# Patient Record
Sex: MALE | Race: BLACK OR AFRICAN AMERICAN | Employment: FULL TIME | URBAN - METROPOLITAN AREA
[De-identification: names, ages, dates, MRNs, and addresses within clinical notes are randomized per-mention and may not be internally consistent; named-entity substitution may affect disease eponyms.]

---

## 2024-11-06 ENCOUNTER — OFFICE VISIT (OUTPATIENT)
Age: 59
End: 2024-11-06
Payer: COMMERCIAL

## 2024-11-06 VITALS — WEIGHT: 170 LBS | BODY MASS INDEX: 21.14 KG/M2 | HEIGHT: 75 IN

## 2024-11-06 DIAGNOSIS — M87.052 AVASCULAR NECROSIS OF BONE OF LEFT HIP: ICD-10-CM

## 2024-11-06 DIAGNOSIS — M25.552 LEFT HIP PAIN: Primary | ICD-10-CM

## 2024-11-06 DIAGNOSIS — M54.16 LUMBAR RADICULOPATHY: ICD-10-CM

## 2024-11-06 PROCEDURE — 99204 OFFICE O/P NEW MOD 45 MIN: CPT | Performed by: PHYSICIAN ASSISTANT

## 2024-11-06 PROCEDURE — 72170 X-RAY EXAM OF PELVIS: CPT | Performed by: PHYSICIAN ASSISTANT

## 2024-11-06 NOTE — PROGRESS NOTES
Patient: Elbert Uribe                MRN: [id number]       SSN: [id number]  YOB: 1965        AGE: 59 y.o.        SEX: male  Body mass index is 21.53 kg/m².    PCP: No primary care provider on file.  11/06/24      This office note has been dictated.      REVIEW OF SYSTEMS:  Constitutional: Negative for fever, chills, weight loss and malaise/fatigue.   HENT: Negative.    Eyes: Negative.    Respiratory: Negative.   Cardiovascular: Negative.   Gastrointestinal: No bowel incontinence or constipation.  Genitourinary: No bladder incontinence or saddle anesthesia.  Skin: Negative.   Neurological: Negative.    Endo/Heme/Allergies: Negative.    Psychiatric/Behavioral: Negative.  Musculoskeletal: As per HPI above.     History reviewed. No pertinent past medical history.    No current outpatient medications on file.    No Known Allergies    Social History     Socioeconomic History    Marital status:      Spouse name: Not on file    Number of children: Not on file    Years of education: Not on file    Highest education level: Not on file   Occupational History    Not on file   Tobacco Use    Smoking status: Unknown    Smokeless tobacco: Not on file   Substance and Sexual Activity    Alcohol use: Not on file    Drug use: Not on file    Sexual activity: Not on file   Other Topics Concern    Not on file   Social History Narrative    Not on file     Social Determinants of Health     Financial Resource Strain: Not on file   Food Insecurity: Not on file   Transportation Needs: Not on file   Physical Activity: Not on file   Stress: Not on file   Social Connections: Not on file   Intimate Partner Violence: Not on file   Housing Stability: Not on file       History reviewed. No pertinent surgical history.        Patient seen evaluated today for repeat advice regarding low back pain, left hip pain and lower extremity pain.  This been going ongoing for 4 to 5 months without injury.  He reports he gets some

## 2024-11-18 ENCOUNTER — OFFICE VISIT (OUTPATIENT)
Age: 59
End: 2024-11-18
Payer: COMMERCIAL

## 2024-11-18 VITALS — BODY MASS INDEX: 21.03 KG/M2 | WEIGHT: 166 LBS

## 2024-11-18 DIAGNOSIS — M99.06 LOWER LIMB REGION SOMATIC DYSFUNCTION: ICD-10-CM

## 2024-11-18 DIAGNOSIS — M99.02 THORACIC REGION SOMATIC DYSFUNCTION: ICD-10-CM

## 2024-11-18 DIAGNOSIS — M16.12 OSTEOARTHRITIS OF ONE HIP, LEFT: ICD-10-CM

## 2024-11-18 DIAGNOSIS — M99.04 SACRAL REGION SOMATIC DYSFUNCTION: ICD-10-CM

## 2024-11-18 DIAGNOSIS — M99.07 UPPER EXTREMITY SOMATIC DYSFUNCTION: ICD-10-CM

## 2024-11-18 DIAGNOSIS — M99.01 CERVICAL SOMATIC DYSFUNCTION: ICD-10-CM

## 2024-11-18 DIAGNOSIS — M99.08 RIB CAGE REGION SOMATIC DYSFUNCTION: ICD-10-CM

## 2024-11-18 DIAGNOSIS — M51.362 DEGENERATION OF INTERVERTEBRAL DISC OF LUMBAR REGION WITH DISCOGENIC BACK PAIN AND LOWER EXTREMITY PAIN: ICD-10-CM

## 2024-11-18 DIAGNOSIS — M25.812 SHOULDER IMPINGEMENT, LEFT: Primary | ICD-10-CM

## 2024-11-18 DIAGNOSIS — M99.09 SOMATIC DYSFUNCTION OF ABDOMINAL REGION: ICD-10-CM

## 2024-11-18 DIAGNOSIS — M99.03 SOMATIC DYSFUNCTION OF LUMBAR REGION: ICD-10-CM

## 2024-11-18 DIAGNOSIS — M99.05 PELVIC REGION SOMATIC DYSFUNCTION: ICD-10-CM

## 2024-11-18 DIAGNOSIS — G57.02 PIRIFORMIS SYNDROME, LEFT: ICD-10-CM

## 2024-11-18 PROCEDURE — 99204 OFFICE O/P NEW MOD 45 MIN: CPT | Performed by: FAMILY MEDICINE

## 2024-11-18 PROCEDURE — 98929 OSTEOPATH MANJ 9-10 REGIONS: CPT | Performed by: FAMILY MEDICINE

## 2024-11-18 RX ORDER — DICLOFENAC SODIUM 75 MG/1
75 TABLET, DELAYED RELEASE ORAL 2 TIMES DAILY WITH MEALS
Qty: 60 TABLET | Refills: 1 | Status: SHIPPED | OUTPATIENT
Start: 2024-11-18

## 2024-11-18 RX ORDER — DICLOFENAC SODIUM 75 MG/1
75 TABLET, DELAYED RELEASE ORAL 2 TIMES DAILY
COMMUNITY
Start: 2024-10-16 | End: 2024-10-23

## 2024-11-18 RX ORDER — CYCLOBENZAPRINE HCL 5 MG
TABLET ORAL
COMMUNITY
Start: 2024-09-22

## 2024-11-18 NOTE — PROGRESS NOTES
HISTORY OF PRESENT ILLNESS    Elbert Uribe 1965 is a 59 y.o. year old male comes in today as new patient for: left shoulder, low back, left hip pain    Patients symptoms have been present for 4 months.  Pain level 10 - Worst pain ever/10. It has improved with Voltaren 75mg.  Patient has tried:  PCP visit and told has arthritis and has done some strengthening.  It is described as pain and numbness left buttock down leg.    Has had MRI hip and lumbar ordered scheduled for 11/27/2024. Here mostly for left shoulder.    IMAGING: XR lumbar 9/20/2024  IMPRESSION:   Degenerative changes left hip.     XR left hip 9/20/2024  IMPRESSION:   Degenerative changes lumbar spine.     XR left shoulder 9/20/2024  IMPRESSION:   Degenerative changes left hip.     No past surgical history on file.  Social History     Socioeconomic History    Marital status:      Spouse name: None    Number of children: None    Years of education: None    Highest education level: None   Tobacco Use    Smoking status: Unknown      Current Outpatient Medications   Medication Sig Dispense Refill    cyclobenzaprine (FLEXERIL) 5 MG tablet take 1 to 2 tablets by mouth at bedtime if needed       No current facility-administered medications for this visit.     No past medical history on file.  No family history on file.    ROS:  + numb, tingle left leg to foot. No incont, fever.    Objective:  Wt 75.3 kg (166 lb)   BMI 21.03 kg/m²   NEURO:  Sensation intact to light touch.  Reflexes +2/4 biceps, triceps, patellar, and Achilles bilaterally.  M/S:  Examined seated and supine.  Slump negative. Spurling negative. Standing flexion test positive left  Sphinx test positive left.  ASIS low left  Iliac crests equal bilaterally Pubes equal bilaterally Medial malleolus low left  Sacral base posterior left  VINNY low left  TTA at C3 on left worse flexion, T2, 4, 5 on right worse flexion, and L4, 5 on left worse flexion  Rib(s) 2, 4, 5 right TTP and posterior

## 2024-11-18 NOTE — PATIENT INSTRUCTIONS
Either scan this QR code on your smartphone:        Or search YouTube for my channel:    Dr. AMEE Camacho    Rotator cuff    Low back/Piriformis

## 2024-11-30 ENCOUNTER — HOSPITAL ENCOUNTER (OUTPATIENT)
Facility: HOSPITAL | Age: 59
Discharge: HOME | End: 2024-12-03
Payer: COMMERCIAL

## 2024-11-30 DIAGNOSIS — M87.052 AVASCULAR NECROSIS OF BONE OF LEFT HIP: ICD-10-CM

## 2024-11-30 DIAGNOSIS — M54.16 LUMBAR RADICULOPATHY: ICD-10-CM

## 2024-11-30 PROCEDURE — 73721 MRI JNT OF LWR EXTRE W/O DYE: CPT

## 2024-11-30 PROCEDURE — 72148 MRI LUMBAR SPINE W/O DYE: CPT

## 2024-12-16 ENCOUNTER — OFFICE VISIT (OUTPATIENT)
Age: 59
End: 2024-12-16
Payer: COMMERCIAL

## 2024-12-16 VITALS — BODY MASS INDEX: 22.8 KG/M2 | WEIGHT: 180 LBS

## 2024-12-16 DIAGNOSIS — M99.07 UPPER EXTREMITY SOMATIC DYSFUNCTION: ICD-10-CM

## 2024-12-16 DIAGNOSIS — M99.09 SOMATIC DYSFUNCTION OF ABDOMINAL REGION: ICD-10-CM

## 2024-12-16 DIAGNOSIS — G57.02 PIRIFORMIS SYNDROME, LEFT: ICD-10-CM

## 2024-12-16 DIAGNOSIS — M25.812 SHOULDER IMPINGEMENT, LEFT: Primary | ICD-10-CM

## 2024-12-16 DIAGNOSIS — M51.362 DEGENERATION OF INTERVERTEBRAL DISC OF LUMBAR REGION WITH DISCOGENIC BACK PAIN AND LOWER EXTREMITY PAIN: ICD-10-CM

## 2024-12-16 DIAGNOSIS — M99.02 THORACIC REGION SOMATIC DYSFUNCTION: ICD-10-CM

## 2024-12-16 DIAGNOSIS — M99.08 RIB CAGE REGION SOMATIC DYSFUNCTION: ICD-10-CM

## 2024-12-16 DIAGNOSIS — M16.12 OSTEOARTHRITIS OF ONE HIP, LEFT: ICD-10-CM

## 2024-12-16 DIAGNOSIS — M99.06 LOWER LIMB REGION SOMATIC DYSFUNCTION: ICD-10-CM

## 2024-12-16 DIAGNOSIS — M99.03 SOMATIC DYSFUNCTION OF LUMBAR REGION: ICD-10-CM

## 2024-12-16 DIAGNOSIS — M99.04 SACRAL REGION SOMATIC DYSFUNCTION: ICD-10-CM

## 2024-12-16 DIAGNOSIS — M99.05 PELVIC REGION SOMATIC DYSFUNCTION: ICD-10-CM

## 2024-12-16 DIAGNOSIS — M99.01 CERVICAL SOMATIC DYSFUNCTION: ICD-10-CM

## 2024-12-16 PROCEDURE — 99214 OFFICE O/P EST MOD 30 MIN: CPT | Performed by: FAMILY MEDICINE

## 2024-12-16 PROCEDURE — 98929 OSTEOPATH MANJ 9-10 REGIONS: CPT | Performed by: FAMILY MEDICINE

## 2024-12-16 RX ORDER — ETODOLAC 400 MG/1
400 TABLET, FILM COATED ORAL 2 TIMES DAILY
Qty: 60 TABLET | Refills: 1 | Status: SHIPPED | OUTPATIENT
Start: 2024-12-16 | End: 2025-02-14

## 2024-12-16 NOTE — PROGRESS NOTES
HISTORY OF PRESENT ILLNESS    Elbert Uribe 1965 is a 59 y.o. year old male comes in today to be evaluated and treated for: left shoulder, low back, left hip pain    Since last appt 11/18/2024 has noticed Sx improved some Voltaren 75mg (not helping) and awaiting results MRI lumbar before start PT. Pain level 7/10. Is doing HEP daily.    IMAGING: MRI lumbar 11/30/2024  IMPRESSION:  1. Central canal stenosis is identified at L4-5 and borderline changes at L3-4  L5-S1 demonstrates bilateral neuroforaminal encroachment without severe  compression of the exiting L5 nerve root    MRI left hip 11/30/2024  IMPRESSION:  Osteoarthritis of both hips, left greater than right including a large  full-thickness cartilage defect in the left superior acetabulum and a smaller  full-thickness cartilage defect in the right superior acetabulum.  No MRI evidence of avascular necrosis or fracture.  Bilateral hamstring origin tendinosis.       XR lumbar 9/20/2024  IMPRESSION:   Degenerative changes left hip.      XR left hip 9/20/2024  IMPRESSION:   Degenerative changes lumbar spine.     XR left shoulder 9/20/2024  IMPRESSION:   Degenerative changes left hip.     No past surgical history on file.  Social History     Socioeconomic History    Marital status:      Spouse name: None    Number of children: None    Years of education: None    Highest education level: None   Tobacco Use    Smoking status: Unknown     Current Outpatient Medications   Medication Sig Dispense Refill    cyclobenzaprine (FLEXERIL) 5 MG tablet take 1 to 2 tablets by mouth at bedtime if needed      diclofenac (VOLTAREN) 75 MG EC tablet Take 1 tablet by mouth with breakfast and with evening meal As needed pain. 60 tablet 1     No current facility-administered medications for this visit.     No past medical history on file.  No family history on file.    ROS:  + numb, tingle left leg to foot. No incont, fever.     Objective:  Wt 81.6 kg (180 lb)   BMI 22.80

## 2025-01-08 ENCOUNTER — OFFICE VISIT (OUTPATIENT)
Age: 60
End: 2025-01-08
Payer: COMMERCIAL

## 2025-01-08 DIAGNOSIS — M16.12 PRIMARY OSTEOARTHRITIS OF LEFT HIP: Primary | ICD-10-CM

## 2025-01-08 PROCEDURE — 99214 OFFICE O/P EST MOD 30 MIN: CPT | Performed by: PHYSICIAN ASSISTANT

## 2025-01-08 NOTE — PROGRESS NOTES
discussed treatment options.  We will get him set up for an intra-articular injection of the left hip to see if we can improve the symptomatology.  Will see him back about 3 to 4 weeks after the injection to  efficacy.    Care plan outlined and precautions discussed. Radiographs and Results were reviewed with the patient.  Medications were reviewed with the patient. All of pt's questions and concerns were addressed.  Increasing symptoms and return precautions associated with chief complaint and evaluation were reviewed with the patient in detail.  The patient demonstrated adequate understanding.  Social determinents of health were discussed and did not alter treatment plan.      JR Remington NEGRETE, PAJasC, ATC      Note:  This note was generated with voice recognition software.  Any typographical/grammatical errors are unintentional.

## 2025-01-27 ENCOUNTER — OFFICE VISIT (OUTPATIENT)
Age: 60
End: 2025-01-27
Payer: COMMERCIAL

## 2025-01-27 VITALS — WEIGHT: 187 LBS | HEIGHT: 75 IN | RESPIRATION RATE: 14 BRPM | BODY MASS INDEX: 23.25 KG/M2

## 2025-01-27 DIAGNOSIS — M99.03 SOMATIC DYSFUNCTION OF LUMBAR REGION: ICD-10-CM

## 2025-01-27 DIAGNOSIS — M16.12 PRIMARY OSTEOARTHRITIS OF LEFT HIP: ICD-10-CM

## 2025-01-27 DIAGNOSIS — M99.06 LOWER LIMB REGION SOMATIC DYSFUNCTION: ICD-10-CM

## 2025-01-27 DIAGNOSIS — M99.05 PELVIC REGION SOMATIC DYSFUNCTION: ICD-10-CM

## 2025-01-27 DIAGNOSIS — M99.02 THORACIC REGION SOMATIC DYSFUNCTION: ICD-10-CM

## 2025-01-27 DIAGNOSIS — M99.08 RIB CAGE REGION SOMATIC DYSFUNCTION: ICD-10-CM

## 2025-01-27 DIAGNOSIS — M99.01 CERVICAL SOMATIC DYSFUNCTION: ICD-10-CM

## 2025-01-27 DIAGNOSIS — M99.09 SOMATIC DYSFUNCTION OF ABDOMINAL REGION: ICD-10-CM

## 2025-01-27 DIAGNOSIS — M51.362 DEGENERATION OF INTERVERTEBRAL DISC OF LUMBAR REGION WITH DISCOGENIC BACK PAIN AND LOWER EXTREMITY PAIN: ICD-10-CM

## 2025-01-27 DIAGNOSIS — M25.812 SHOULDER IMPINGEMENT, LEFT: Primary | ICD-10-CM

## 2025-01-27 DIAGNOSIS — M99.04 SACRAL REGION SOMATIC DYSFUNCTION: ICD-10-CM

## 2025-01-27 DIAGNOSIS — G57.02 PIRIFORMIS SYNDROME, LEFT: ICD-10-CM

## 2025-01-27 DIAGNOSIS — M99.07 UPPER EXTREMITY SOMATIC DYSFUNCTION: ICD-10-CM

## 2025-01-27 PROCEDURE — 98929 OSTEOPATH MANJ 9-10 REGIONS: CPT | Performed by: FAMILY MEDICINE

## 2025-01-27 PROCEDURE — 99214 OFFICE O/P EST MOD 30 MIN: CPT | Performed by: FAMILY MEDICINE

## 2025-01-27 RX ORDER — ETODOLAC 400 MG/1
400 TABLET, FILM COATED ORAL 2 TIMES DAILY
Qty: 60 TABLET | Refills: 1 | Status: SHIPPED | OUTPATIENT
Start: 2025-01-27 | End: 2025-03-28

## 2025-01-27 NOTE — PROGRESS NOTES
HISTORY OF PRESENT ILLNESS    Elbert Uribe 1965 is a 59 y.o. year old male comes in today to be evaluated and treated for: left shoulder, low back, left hip pain    Since last appt 12/16/2024 has noticed Sx improved with HEP. Pain level 7/10. Using Lodine with benefit. Left shoulder much better for sure. Has intraarticular injection left hip planned for this week at .    IMAGING: MRI lumbar 11/30/2024  IMPRESSION:  1. Central canal stenosis is identified at L4-5 and borderline changes at L3-4  L5-S1 demonstrates bilateral neuroforaminal encroachment without severe  compression of the exiting L5 nerve root     MRI left hip 11/30/2024  IMPRESSION:  Osteoarthritis of both hips, left greater than right including a large  full-thickness cartilage defect in the left superior acetabulum and a smaller  full-thickness cartilage defect in the right superior acetabulum.  No MRI evidence of avascular necrosis or fracture.  Bilateral hamstring origin tendinosis.        XR lumbar 9/20/2024 images reviewed and agree with:   IMPRESSION:   Degenerative changes left hip.      XR left hip 9/20/2024  IMPRESSION:   Degenerative changes lumbar spine.     XR left shoulder 9/20/2024  IMPRESSION:   Degenerative changes left hip.     History reviewed. No pertinent surgical history.  Social History     Socioeconomic History    Marital status:      Spouse name: None    Number of children: None    Years of education: None    Highest education level: None   Tobacco Use    Smoking status: Unknown     Current Outpatient Medications   Medication Sig Dispense Refill    etodolac (LODINE) 400 MG tablet Take 1 tablet by mouth 2 times daily 60 tablet 1    cyclobenzaprine (FLEXERIL) 5 MG tablet take 1 to 2 tablets by mouth at bedtime if needed      diclofenac (VOLTAREN) 75 MG EC tablet Take 1 tablet by mouth with breakfast and with evening meal As needed pain. 60 tablet 1     No current facility-administered medications for this visit.

## 2025-01-28 ENCOUNTER — HOSPITAL ENCOUNTER (OUTPATIENT)
Facility: HOSPITAL | Age: 60
Discharge: HOME | End: 2025-01-31
Payer: COMMERCIAL

## 2025-01-28 DIAGNOSIS — M16.12 PRIMARY OSTEOARTHRITIS OF LEFT HIP: ICD-10-CM

## 2025-01-28 PROCEDURE — 6360000002 HC RX W HCPCS: Performed by: PHYSICIAN ASSISTANT

## 2025-01-28 PROCEDURE — 77002 NEEDLE LOCALIZATION BY XRAY: CPT

## 2025-01-28 PROCEDURE — 20610 DRAIN/INJ JOINT/BURSA W/O US: CPT

## 2025-01-28 PROCEDURE — 6360000004 HC RX CONTRAST MEDICATION: Performed by: PHYSICIAN ASSISTANT

## 2025-01-28 RX ORDER — TRIAMCINOLONE ACETONIDE 40 MG/ML
40 INJECTION, SUSPENSION INTRA-ARTICULAR; INTRAMUSCULAR ONCE
Status: CANCELLED | OUTPATIENT
Start: 2025-01-28 | End: 2025-01-28

## 2025-01-28 RX ORDER — TRIAMCINOLONE ACETONIDE 40 MG/ML
40 INJECTION, SUSPENSION INTRA-ARTICULAR; INTRAMUSCULAR ONCE
Status: COMPLETED | OUTPATIENT
Start: 2025-01-28 | End: 2025-01-28

## 2025-01-28 RX ORDER — LIDOCAINE HYDROCHLORIDE 10 MG/ML
10 INJECTION, SOLUTION EPIDURAL; INFILTRATION; INTRACAUDAL; PERINEURAL ONCE
Status: CANCELLED | OUTPATIENT
Start: 2025-01-28 | End: 2025-01-28

## 2025-01-28 RX ORDER — IOPAMIDOL 408 MG/ML
10 INJECTION, SOLUTION INTRATHECAL
Status: COMPLETED | OUTPATIENT
Start: 2025-01-28 | End: 2025-01-28

## 2025-01-28 RX ORDER — LIDOCAINE HYDROCHLORIDE 10 MG/ML
10 INJECTION, SOLUTION EPIDURAL; INFILTRATION; INTRACAUDAL; PERINEURAL ONCE
OUTPATIENT
Start: 2025-01-28 | End: 2025-01-28

## 2025-01-28 RX ORDER — LIDOCAINE HYDROCHLORIDE 10 MG/ML
10 INJECTION, SOLUTION EPIDURAL; INFILTRATION; INTRACAUDAL; PERINEURAL
Status: COMPLETED | OUTPATIENT
Start: 2025-01-28 | End: 2025-01-28

## 2025-01-28 RX ADMIN — IOPAMIDOL 10 ML: 408 INJECTION, SOLUTION INTRATHECAL at 13:51

## 2025-01-28 RX ADMIN — LIDOCAINE HYDROCHLORIDE 10 ML: 10 INJECTION, SOLUTION EPIDURAL; INFILTRATION; INTRACAUDAL; PERINEURAL at 13:50

## 2025-01-28 RX ADMIN — TRIAMCINOLONE ACETONIDE 40 MG: 40 INJECTION, SUSPENSION INTRA-ARTICULAR; INTRAMUSCULAR at 13:52

## 2025-05-08 ENCOUNTER — OFFICE VISIT (OUTPATIENT)
Age: 60
End: 2025-05-08
Payer: COMMERCIAL

## 2025-05-08 VITALS — WEIGHT: 131 LBS | HEIGHT: 74 IN | BODY MASS INDEX: 16.81 KG/M2

## 2025-05-08 DIAGNOSIS — M17.11 OSTEOARTHRITIS OF RIGHT KNEE, UNSPECIFIED OSTEOARTHRITIS TYPE: ICD-10-CM

## 2025-05-08 DIAGNOSIS — M17.12 OSTEOARTHRITIS OF LEFT KNEE, UNSPECIFIED OSTEOARTHRITIS TYPE: ICD-10-CM

## 2025-05-08 DIAGNOSIS — M25.562 PAIN IN BOTH KNEES, UNSPECIFIED CHRONICITY: Primary | ICD-10-CM

## 2025-05-08 DIAGNOSIS — M25.561 PAIN IN BOTH KNEES, UNSPECIFIED CHRONICITY: Primary | ICD-10-CM

## 2025-05-08 PROCEDURE — 20611 DRAIN/INJ JOINT/BURSA W/US: CPT | Performed by: ORTHOPAEDIC SURGERY

## 2025-05-08 PROCEDURE — 99203 OFFICE O/P NEW LOW 30 MIN: CPT | Performed by: ORTHOPAEDIC SURGERY

## 2025-05-08 RX ORDER — TRIAMCINOLONE ACETONIDE 40 MG/ML
40 INJECTION, SUSPENSION INTRA-ARTICULAR; INTRAMUSCULAR ONCE
Status: COMPLETED | OUTPATIENT
Start: 2025-05-08 | End: 2025-05-08

## 2025-05-08 RX ORDER — NAPROXEN 500 MG/1
TABLET ORAL
COMMUNITY
Start: 2025-04-18

## 2025-05-08 RX ORDER — LIDOCAINE HYDROCHLORIDE 10 MG/ML
9 INJECTION, SOLUTION INFILTRATION; PERINEURAL ONCE
Status: COMPLETED | OUTPATIENT
Start: 2025-05-08 | End: 2025-05-08

## 2025-05-08 RX ADMIN — LIDOCAINE HYDROCHLORIDE 9 ML: 10 INJECTION, SOLUTION INFILTRATION; PERINEURAL at 15:19

## 2025-05-08 RX ADMIN — TRIAMCINOLONE ACETONIDE 40 MG: 40 INJECTION, SUSPENSION INTRA-ARTICULAR; INTRAMUSCULAR at 15:20

## 2025-05-08 NOTE — PROGRESS NOTES
Name: Elbert Uribe    : 1965     [facility]  [address]  [place] [address]  Dept: [phone]  Dept Fax: [phone]   Chief Complaint   Patient presents with    Knee Pain     bilateral     Ht 1.88 m (6' 2\")   Wt 59.4 kg (131 lb)   BMI 16.82 kg/m²    No Known Allergies  Current Outpatient Medications   Medication Sig Dispense Refill    naproxen (NAPROSYN) 500 MG tablet       etodolac (LODINE) 400 MG tablet Take 1 tablet by mouth 2 times daily 60 tablet 1    cyclobenzaprine (FLEXERIL) 5 MG tablet take 1 to 2 tablets by mouth at bedtime if needed       Current Facility-Administered Medications   Medication Dose Route Frequency Provider Last Rate Last Admin    lidocaine 1 % injection 9 mL  9 mL Intra-artICUlar Once         triamcinolone acetonide (KENALOG-40) injection 40 mg  40 mg Intra-artICUlar Once         lidocaine 1 % injection 9 mL  9 mL Other Once         triamcinolone acetonide (KENALOG-40) injection 40 mg  40 mg Other Once            There is no problem list on file for this patient.     Family History   Family history unknown: Yes       History reviewed. No pertinent surgical history.   History reviewed. No pertinent past medical history.     I have reviewed and agree with PFS and ROS and intake form in chart and the record furthermore I have reviewed prior medical record(s) regarding this patients care during this appointment.   Review of Systems:   Patient is a pleasant appearing individual, appropriately dressed, well hydrated, well nourished, who is alert, appropriately oriented for age, and in no acute distress with a normal gait and normal affect who does not appear to be in any significant pain.       Physical Exam:  Left Knee -Decrease range of motion with flexion, Knee arc of greater than 50 degrees, Some crepitation, Grossly neurovascularly intact, Good cap refill, No skin